# Patient Record
Sex: FEMALE | URBAN - METROPOLITAN AREA
[De-identification: names, ages, dates, MRNs, and addresses within clinical notes are randomized per-mention and may not be internally consistent; named-entity substitution may affect disease eponyms.]

---

## 2021-04-02 DIAGNOSIS — Z86.018 HISTORY OF UTERINE FIBROID: ICD-10-CM

## 2021-04-02 DIAGNOSIS — N92.0 MENORRHAGIA WITH REGULAR CYCLE: ICD-10-CM

## 2021-04-02 DIAGNOSIS — Z86.711 HISTORY OF PULMONARY EMBOLISM: Primary | ICD-10-CM

## 2021-04-02 NOTE — PROGRESS NOTES
Order placed for six months of Lupron for menorrhagia, fibroids, anemia and recent PE on anticoagulants

## 2021-04-07 ENCOUNTER — TELEPHONE (OUTPATIENT)
Dept: OBGYN CLINIC | Facility: CLINIC | Age: 51
End: 2021-04-07

## 2021-04-07 NOTE — TELEPHONE ENCOUNTER
Initiated prior auth on cover my meds      ----- Message from Rebeca Bautista MD sent at 4/2/2021  1:56 PM EDT -----  Regarding: Lupron order  Spencer Berry, I placed the order for the Lupron   It printed in [facility]

## 2021-04-07 NOTE — TELEPHONE ENCOUNTER
Spoke with patient at 8:30 this morning requesting information for prescription coverage  She will have spouse return call with information    Spoke with spouse approx 3pm  [employer] RX ID# [id number], Group#[id number], RX PCN: [id number], [id number]

## 2021-04-12 NOTE — TELEPHONE ENCOUNTER
Received Denial from [employer] for lupron  Dr Naomi Mcdonnell made aware   Document to [facility] to scan for Dr Naomi Mcdonnell to review

## 2021-04-15 ENCOUNTER — TELEPHONE (OUTPATIENT)
Dept: OBGYN CLINIC | Facility: CLINIC | Age: 51
End: 2021-04-15

## 2021-04-15 NOTE — TELEPHONE ENCOUNTER
Spoke with pt, aware of Lupron denial  Has not had menses yet, although PMS  LMP 3/15  Had first COVID vaccine (Amanda Hooks) 4/7/21  Will be seeing Dr Radha Ryan (hematology) 5/17/21, recommend addressing Lupron with her as well  Cardiology cleared her for prn ibuprofen; suggest 400 mg TID for heavy days if needed with menses  Agrees to plan

## 2021-06-09 ENCOUNTER — TELEPHONE (OUTPATIENT)
Dept: OBGYN CLINIC | Facility: CLINIC | Age: 51
End: 2021-06-09

## 2021-06-09 NOTE — TELEPHONE ENCOUNTER
Received fax request from [address] [place] [address] for refill of Francine and Estradiol  Spoke with Allyn Varela  She has not requested either of these RX  The Francine is discontinued due to blood clots   Returned form to [employer] indicating both medications denied and francine discontinued